# Patient Record
Sex: MALE | Race: WHITE
[De-identification: names, ages, dates, MRNs, and addresses within clinical notes are randomized per-mention and may not be internally consistent; named-entity substitution may affect disease eponyms.]

---

## 2020-09-23 ENCOUNTER — HOSPITAL ENCOUNTER (EMERGENCY)
Dept: HOSPITAL 41 - JD.ED | Age: 20
Discharge: HOME | End: 2020-09-23
Payer: COMMERCIAL
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
## 2020-09-23 DIAGNOSIS — S09.90XA: Primary | ICD-10-CM

## 2020-09-23 DIAGNOSIS — Z23: ICD-10-CM

## 2020-09-23 DIAGNOSIS — S01.21XA: ICD-10-CM

## 2020-09-23 PROCEDURE — 90471 IMMUNIZATION ADMIN: CPT

## 2020-09-23 PROCEDURE — 90715 TDAP VACCINE 7 YRS/> IM: CPT

## 2020-09-23 PROCEDURE — 70450 CT HEAD/BRAIN W/O DYE: CPT

## 2020-09-23 PROCEDURE — 12011 RPR F/E/E/N/L/M 2.5 CM/<: CPT

## 2020-09-23 PROCEDURE — 99285 EMERGENCY DEPT VISIT HI MDM: CPT
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
## 2020-09-23 NOTE — CT
Head CT

 

Technique: Multiple axial sections through the brain were obtained.  

Intravenous contrast was not utilized.

 

Comparison: No prior intracranial imaging is available.

 

Findings: Ventricles along with basal cisterns and sulci over the 

convexities are within normal limits for the patient's age.  No 

abnormal parenchymal densities are seen.  No evidence of intracranial 

hemorrhage.  No midline shift or mass effect is seen.

 

Mild mucosal thickening is seen within the sphenoid sinus and minimal 

mucosal thickening within the ethmoid sinuses which is most likely 

chronic.  No acute mastoid sinus findings are seen.  No acute 

calvarial abnormality is appreciated.

 

Impression:

1.  Sinus findings which are most likely pre-existing and chronic.

2.  No acute intracranial abnormality is identified.

 

Diagnostic code #2

 

This report was dictated in MDT